# Patient Record
Sex: MALE | Race: WHITE | NOT HISPANIC OR LATINO | Employment: UNEMPLOYED | URBAN - NONMETROPOLITAN AREA
[De-identification: names, ages, dates, MRNs, and addresses within clinical notes are randomized per-mention and may not be internally consistent; named-entity substitution may affect disease eponyms.]

---

## 2022-05-03 ENCOUNTER — OFFICE VISIT (OUTPATIENT)
Dept: CHIROPRACTIC MEDICINE | Facility: OTHER | Age: 62
End: 2022-05-03
Attending: CHIROPRACTOR
Payer: COMMERCIAL

## 2022-05-03 DIAGNOSIS — M99.02 SEGMENTAL AND SOMATIC DYSFUNCTION OF THORACIC REGION: ICD-10-CM

## 2022-05-03 DIAGNOSIS — M99.01 SEGMENTAL AND SOMATIC DYSFUNCTION OF CERVICAL REGION: Primary | ICD-10-CM

## 2022-05-03 DIAGNOSIS — M54.2 CERVICALGIA: ICD-10-CM

## 2022-05-03 PROCEDURE — 98940 CHIROPRACT MANJ 1-2 REGIONS: CPT | Mod: AT | Performed by: CHIROPRACTOR

## 2022-05-03 PROCEDURE — 99202 OFFICE O/P NEW SF 15 MIN: CPT | Mod: 25 | Performed by: CHIROPRACTOR

## 2022-05-04 NOTE — PROGRESS NOTES
Subjective Finding:    Chief compalint: Patient presents with:  Neck Pain: Right arm pain  , Pain Scale: 8/10, Intensity: sharp, Duration: 2 weeks, Radiating: down right arm.    Date of injury:     Activities that the pain restricts:   Home/household/hobbies/social activities: yes.  Work duties: yes.  Sleep: yes.  Makes symptoms better: rest.  Makes symptoms worse: activity, cervical extension and cervical flexion.  Have you seen anyone else for the symptoms? No.  Work related: no.  Automobile related injury: no.    Objective and Assessment:    Posture Analysis:   High shoulder: right.  Head tilt: right.  High iliac crest: .  Head carriage: forward.  Thoracic Kyphosis: neutral.  Lumbar Lordosis: neutral.    Lumbar Range of Motion: .  Cervical Range of Motion: extension decreased and right lateral flexion decreased.  Thoracic Range of Motion: .  Extremity Range of Motion: .    Palpation:   Traps: sharp pain and stiff, referred pain: yes    Segmental dysfunction pre-treatment and treatment area: C5, C7, T1 and T3.    Assessment post-treatment:  Cervical: ROM increased.  Thoracic: ROM increased.  Lumbar: .    Comments: .      Complicating Factors: .    Procedure(s):  [facility]:  [address] Chiropractic manipulative treatment 1-2 regions performed   Cervical: Diversified, See above for level, Supine and Thoracic: Diversified, See above for level, Prone    Modalities:  None performed this visit    Therapeutic procedures:  None    Plan:  Treatment plan: 2 times per week for 2 weeks.  Instructed patient: stretch as instructed at visit.  Short term goals: increase ROM.  Long term goals: increase ADL.  Prognosis: very good.

## 2023-06-13 ENCOUNTER — HOSPITAL ENCOUNTER (EMERGENCY)
Facility: HOSPITAL | Age: 63
Discharge: HOME | End: 2023-06-13
Attending: NURSE PRACTITIONER | Admitting: NURSE PRACTITIONER
Payer: COMMERCIAL

## 2023-06-13 ENCOUNTER — NURSE TRIAGE (OUTPATIENT)
Dept: FAMILY MEDICINE | Facility: OTHER | Age: 63
End: 2023-06-13

## 2023-06-13 VITALS
RESPIRATION RATE: 18 BRPM | WEIGHT: 145 LBS | HEART RATE: 101 BPM | TEMPERATURE: 98.9 F | OXYGEN SATURATION: 95 % | DIASTOLIC BLOOD PRESSURE: 77 MMHG | SYSTOLIC BLOOD PRESSURE: 120 MMHG

## 2023-06-13 DIAGNOSIS — S80.861A TICK BITE OF CALF, RIGHT, INITIAL ENCOUNTER: ICD-10-CM

## 2023-06-13 DIAGNOSIS — L03.115 CELLULITIS OF RIGHT LOWER EXTREMITY: ICD-10-CM
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
## 2023-06-13 PROCEDURE — G0463 HOSPITAL OUTPT CLINIC VISIT: HCPCS

## 2023-06-13 PROCEDURE — 99213 OFFICE O/P EST LOW 20 MIN: CPT | Performed by: NURSE PRACTITIONER

## 2023-06-13 RX ORDER — DOXYCYCLINE 100 MG/1
100 CAPSULE ORAL 2 TIMES DAILY
Qty: 28 CAPSULE | Refills: 0 | Status: SHIPPED | OUTPATIENT
Start: 2023-06-13 | End: 2023-06-27

## 2023-06-13 ASSESSMENT — ENCOUNTER SYMPTOMS
VOMITING: 0
MYALGIAS: 0
JOINT SWELLING: 0
SHORTNESS OF BREATH: 0
HEADACHES: 0
NAUSEA: 0
COLOR CHANGE: 1
DIZZINESS: 0
ACTIVITY CHANGE: 1
FEVER: 0
LIGHT-HEADEDNESS: 0
CHILLS: 0

## 2023-06-13 NOTE — DISCHARGE INSTRUCTIONS
Return to ER/urgent care or follow-up with primary care provider if developed any of the symptoms as noted on the education for Lyme's disease.    Benadryl for itching.  Ibuprofen and/or acetaminophen for discomfort.  Do not take milk products two hours before or after taking doxycycline.  Do not take multivitamins and avoid the sun when taking doxycycline.  Return to ER for signs of infection, including fever, increased redness, purulent drainage from wound, and increased pain, or difficulty breathing

## 2023-06-13 NOTE — ED TRIAGE NOTES
Pt presents with c/o tick bite  Does have a big red ring, that stretches 3 inches wide, and a small black wound in the middle. On his left inner calf right below the knee  States that he did pull a tick off on Saturday.   Denies any itching, or pain.

## 2023-06-13 NOTE — ED TRIAGE NOTES
Patient presents with complaints of a tic bite, states he pulled off a tic on Saturday and now worried about the bite.

## 2023-06-13 NOTE — ED PROVIDER NOTES
History     Chief Complaint   Patient presents with     Insect Bite     HPI  Chito Ewing is a 63 year old male who presents with red ring and swelling around a tick bite on his right inner knee.  States the tick was swollen.  Thought it was only connected for possibly a day.  Unsure if it was a deer tick.  No OTC medications have been taken or home interventions applied.  Smoker.  Denies fevers, chills, nausea, vomiting, increased shortness of breath, headaches, dizziness, lightheadedness, or body aches.    Allergies:  Allergies   Allergen Reactions     Penicillins Nausea       Problem List:    There are no problems to display for this patient.       Past Medical History:    History reviewed. No pertinent past medical history.    Past Surgical History:    History reviewed. No pertinent surgical history.    Family History:    History reviewed. No pertinent family history.    Social History:  Marital Status:  Single [1]        Medications:    doxycycline hyclate (VIBRAMYCIN) 100 MG capsule          Review of Systems   Constitutional: Positive for activity change. Negative for chills and fever.   Respiratory: Negative for shortness of breath (Chronic not worse than normal).    Gastrointestinal: Negative for nausea and vomiting.   Musculoskeletal: Negative for joint swelling and myalgias.   Skin: Positive for color change.        Right inner lower leg tick bite red and swollen   Neurological: Negative for dizziness, light-headedness and headaches.       Physical Exam   BP: 120/77  Pulse: 101  Temp: 98.9  F (37.2  C)  Resp: 18  Weight: 65.8 kg (145 lb)  SpO2: 95 %      Physical Exam  Vitals and nursing note reviewed.   Constitutional:       General: He is in acute distress (moderate).      Appearance: He is normal weight.   Cardiovascular:      Rate and Rhythm: Normal rate.   Pulmonary:      Effort: Pulmonary effort is normal.   Musculoskeletal:         General: Swelling and tenderness present.         Legs:    Skin:     General: Skin is warm and dry.      Findings: Erythema present. No bruising.   Neurological:      Mental Status: He is alert and oriented to person, place, and time.   Psychiatric:         Behavior: Behavior normal.             ED Course                 Procedures             No results found for this or any previous visit (from the past 24 hour(s)).    Medications - No data to display    Assessments & Plan (with Medical Decision Making)     I have reviewed the nursing notes.    I have reviewed the findings, diagnosis, plan and need for follow up with the patient.  (S80.861A,  W57.XXXA) Tick bite of calf, right, initial encounter    (L03.115) Cellulitis of right lower extremity  Comment: 63 year old male who presents with red ring and swelling around a tick bite on his right inner knee.  States the tick was swollen.  Thought it was only connected for possibly a day.  Unsure if it was a deer tick.  No OTC medications have been taken or home interventions applied.  Smoker.  Denies fevers, chills, nausea, vomiting, increased shortness of breath, headaches, dizziness, lightheadedness, or body aches.    MDM: 7 cm diameter of moderate swelling and erythema encircling 2 mm blackened bite region where tick was attached right, medial, proximal lower leg. Erythema extending 6 to 7 cm inferior into calf and 3 cm superiorly into distal thigh from the original bite. See Media    Plan: Doxycycline twice daily for 14 days.  Education provided and/or discussed for this/these medication and Lyme's disease.  Return to ER/urgent care or follow-up with primary care provider if developed any of the symptoms as noted on the education for Lyme's disease.    Benadryl for itching.  Ibuprofen and/or acetaminophen for discomfort.  Do not take milk products two hours before or after taking doxycycline.  Do not take multivitamins and avoid the sun when taking doxycycline.  Return to ER for signs of infection, including fever,  increased redness, purulent drainage from wound, and increased pain, or difficulty breathing  These discharge instructions and medications were reviewed with him and understanding verbalized.    This document was prepared using a combination of typing and voice generated software.  While every attempt was made for accuracy, spelling and grammatical errors may exist.    Discharge Medication List as of 6/13/2023  3:58 PM      START taking these medications    Details   doxycycline hyclate (VIBRAMYCIN) 100 MG capsule Take 1 capsule (100 mg) by mouth 2 times daily for 14 days, Disp-28 capsule, R-0, E-Prescribe             Final diagnoses:   Tick bite of calf, right, initial encounter   Cellulitis of right lower extremity       6/13/2023   [facility]       Maddi Jimenez, CNP  06/13/23 1422